# Patient Record
Sex: MALE | Race: WHITE
[De-identification: names, ages, dates, MRNs, and addresses within clinical notes are randomized per-mention and may not be internally consistent; named-entity substitution may affect disease eponyms.]

---

## 2018-08-22 ENCOUNTER — HOSPITAL ENCOUNTER (INPATIENT)
Dept: HOSPITAL 11 - JP.ED | Age: 78
LOS: 2 days | Discharge: HOME | DRG: 563 | End: 2018-08-24
Attending: INTERNAL MEDICINE | Admitting: INTERNAL MEDICINE
Payer: MEDICARE

## 2018-08-22 DIAGNOSIS — R42: ICD-10-CM

## 2018-08-22 DIAGNOSIS — E78.00: ICD-10-CM

## 2018-08-22 DIAGNOSIS — S82.435A: ICD-10-CM

## 2018-08-22 DIAGNOSIS — I48.2: ICD-10-CM

## 2018-08-22 DIAGNOSIS — Z88.2: ICD-10-CM

## 2018-08-22 DIAGNOSIS — H54.7: ICD-10-CM

## 2018-08-22 DIAGNOSIS — S82.445A: Primary | ICD-10-CM

## 2018-08-22 DIAGNOSIS — M25.572: ICD-10-CM
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
## 2018-08-22 DIAGNOSIS — R00.2: ICD-10-CM
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
## 2018-08-22 DIAGNOSIS — Z79.01: ICD-10-CM

## 2018-08-22 PROCEDURE — 83735 ASSAY OF MAGNESIUM: CPT

## 2018-08-22 PROCEDURE — C9113 INJ PANTOPRAZOLE SODIUM, VIA: HCPCS

## 2018-08-22 PROCEDURE — 81001 URINALYSIS AUTO W/SCOPE: CPT

## 2018-08-22 PROCEDURE — 85025 COMPLETE CBC W/AUTO DIFF WBC: CPT

## 2018-08-22 PROCEDURE — 27788 TREATMENT OF ANKLE FRACTURE: CPT

## 2018-08-22 PROCEDURE — 36415 COLL VENOUS BLD VENIPUNCTURE: CPT

## 2018-08-22 PROCEDURE — 84484 ASSAY OF TROPONIN QUANT: CPT

## 2018-08-22 PROCEDURE — 93005 ELECTROCARDIOGRAM TRACING: CPT

## 2018-08-22 PROCEDURE — 73610 X-RAY EXAM OF ANKLE: CPT

## 2018-08-22 PROCEDURE — 99285 EMERGENCY DEPT VISIT HI MDM: CPT

## 2018-08-22 PROCEDURE — 0QSKXZZ REPOSITION LEFT FIBULA, EXTERNAL APPROACH: ICD-10-PCS | Performed by: NURSE PRACTITIONER

## 2018-08-22 PROCEDURE — 80048 BASIC METABOLIC PNL TOTAL CA: CPT

## 2018-08-22 PROCEDURE — 96374 THER/PROPH/DIAG INJ IV PUSH: CPT

## 2018-08-23 PROCEDURE — 2W3RX1Z IMMOBILIZATION OF LEFT LOWER LEG USING SPLINT: ICD-10-PCS | Performed by: NURSE PRACTITIONER

## 2018-08-23 NOTE — PCM.CONS
H&P History of Present Illness





- General


Date of Service: 08/23/18


Admit Problem/Dx: 


 Admission Diagnosis/Problem





Admission Diagnosis/Problem      Ankle fracture








Source of Information: Patient, Provider, RN


History Limitations: Reports: No Limitations





- History of Present Illness


Onset of Symptoms: Reports: Sudden


Duration of Symptoms: Reports: Hour(s):


Location: Reports: Lower Extremity, Left


Quality: Reports: Pressure, Throbbing


Severity: Moderate


Improves with: Reports: None


Worsens with: Reports: None


Associated Symptoms: Reports: No Other Symptoms


  ** Left Ankle


Pain Score (Numeric/FACES): 5





- Related Data


Allergies/Adverse Reactions: 


 Allergies











Allergy/AdvReac Type Severity Reaction Status Date / Time


 


Sulfa (Sulfonamide Allergy  Swelling Verified 11/17/16 06:43





Antibiotics)     











Home Medications: 


 Home Meds





Lutein Extract/Zeaxanthin Ext [Lutein 15 MG Softgel] 1 each PO DAILY 01/06/14 [

History]


Multivitamin [Multi Vitamin Daily] 1 each PO DAILY 01/06/14 [History]


Rivaroxaban [Xarelto] 20 mg PO DAILY 08/22/18 [History]


atorvaSTATin [Lipitor] 20 mg PO BEDTIME 08/22/18 [History]











Past Medical History


HEENT History: Reports: Allergic Rhinitis, Cataract, Impaired Vision


Cardiovascular History: Reports: Afib, High Cholesterol


Gastrointestinal History: Reports: Colon Polyp


Musculoskeletal History: Reports: Fracture


Hematologic History: Reports: Anticoagulation Therapy





- Infectious Disease History


Infectious Disease History: Reports: Chicken Pox, Measles, Mumps, Shingles





- Past Surgical History


HEENT Surgical History: Reports: Cataract Surgery


GI Surgical History: Reports: Appendectomy, Colonoscopy, Hernia, Abdominal, 

Hernia, Inguinal


Dermatological Surgical History: Reports: Skin Biopsy





Social & Family History





- Tobacco Use


Smoking Status *Q: Never Smoker


Second Hand Smoke Exposure: No





- Caffeine Use


Caffeine Use: Reports: Coffee





- Alcohol Use


Days Per Week of Alcohol Use: 5


Number of Drinks Per Day: 3


Total Drinks Per Week: 15


Date of Last Drink: 08/22/18


Time of Last Drink: 17:00





- Recreational Drug Use


Recreational Drug Use: No





- Living Situation & Occupation


Living situation: Reports:  (x30 year. has a S.O.), Single, Alone


Occupation: Retired (retired , [place], 

has 3 children , who [place]. he is very active)





H&P Review of Systems





- Review of Systems:


Review Of Systems: See Below


General: Reports: No Symptoms


HEENT: Reports: No Symptoms


Pulmonary: Reports: No Symptoms


Cardiovascular: Reports: No Symptoms


Gastrointestinal: Reports: No Symptoms


Genitourinary: Reports: No Symptoms


Musculoskeletal: Reports: Joint Pain


Skin: Reports: No Symptoms


Psychiatric: Reports: No Symptoms


Neurological: Reports: No Symptoms


Hematologic/Lymphatic: Reports: No Symptoms


Immunologic: Reports: No Symptoms





Exam





- Exam


Exam: See Below





- Vital Signs


Vital Signs: 


 Last Vital Signs











Temp  97.7 F   08/23/18 06:49


 


Pulse  77   08/23/18 06:49


 


Resp  15   08/23/18 06:49


 


BP  135/66   08/23/18 06:49


 


Pulse Ox  97   08/23/18 07:12











Weight: 209 lb 9.6 oz





- Exam


General: Alert, Oriented


HEENT: Mucosa Moist & Pink, Pupils Equal, Pupils Reactive


Neck: Supple, Trachea Midline


Cardiovascular: Regular Rate


Extremities: Leg Pain, Limited Range of Motion


Peripheral Pulses: 2+: Dorsalis Pedis (L)


Skin: Warm, Dry, Intact





- Patient Data


Lab Results Last 24 hrs: 


 Laboratory Results - last 24 hr











  08/23/18 08/23/18 08/23/18 Range/Units





  03:36 05:11 05:11 


 


WBC   8.4   (4.5-11.0)  K/uL


 


RBC   3.98 L   (4.30-5.90)  M/uL


 


Hgb   13.6  D   (12.0-15.0)  g/dL


 


Hct   40.6   (40.0-54.0)  %


 


MCV   102 H   (80-98)  fL


 


MCH   34 H   (27-31)  pg


 


MCHC   34   (32-36)  %


 


Plt Count   255   (150-400)  K/uL


 


Neut % (Auto)   62   (36-66)  %


 


Lymph % (Auto)   19 L   (24-44)  %


 


Mono % (Auto)   17 H   (2-6)  %


 


Eos % (Auto)   2   (2-4)  %


 


Baso % (Auto)   0   (0-1)  %


 


Sodium    135 L  (140-148)  mmol/L


 


Potassium    4.0  (3.6-5.2)  mmol/L


 


Chloride    102  (100-108)  mmol/L


 


Carbon Dioxide    23  (21-32)  mmol/L


 


Anion Gap    14.0  (5.0-14.0)  mmol/L


 


BUN    9  (7-18)  mg/dL


 


Creatinine    0.9  (0.8-1.3)  mg/dL


 


Est Cr Clr Drug Dosing    67.65  mL/min


 


Estimated GFR (MDRD)    > 60  (>60)  


 


Glucose    107 H  ()  mg/dL


 


Calcium    7.8 L  (8.5-10.1)  mg/dL


 


Urine Color  Yellow    


 


Urine Appearance  Clear    


 


Urine pH  5.0    (4.5-8.0)  


 


Ur Specific Gravity  1.010    (1.008-1.030)  


 


Urine Protein  Negative    (NEGATIVE)  mg/dL


 


Urine Glucose (UA)  Normal    (NEGATIVE)  mg/dL


 


Urine Ketones  Negative    (NEGATIVE)  mg/dL


 


Urine Occult Blood  Negative    (NEGATIVE)  


 


Urine Nitrite  Negative    (NEGAITVE)  


 


Urine Bilirubin  Negative    (NEGATIVE)  


 


Urine Urobilinogen  Normal    (NORMAL)  mg/dL


 


Ur Leukocyte Esterase  Negative    (NEGATIVE)  


 


Urine RBC  0-5    (0-5)  


 


Urine WBC  0-5    (0-5)  


 


Ur Epithelial Cells  Rare    


 


Amorphous Sediment  Rare    


 


Urine Bacteria  Not seen    


 


Urine Mucus  Few    











Result Diagrams: 


 08/23/18 05:11





 08/23/18 05:11


Imaging Impressions Last 24 hrs: 


Left ankle, Dinh B, closed, distal fibula fracture.








Consult PN Assessment/Plan


POD#: 0


Procedures: 


Procedures





ASSAY OF MAGNESIUM (01/08/14)


ASSAY OF TROPONIN QUANT (01/08/14)


ASSAY THYROID STIM HORMONE (01/08/14)


CATARACT SURG W/IOL 1 STAGE (11/17/16)


COLONOSCOPY AND BIOPSY (01/08/14)


COMPLETE CBC W/AUTO DIFF WBC (01/08/14)


COMPREHEN METABOLIC PANEL (01/08/14)


ECG MONIT/REPRT UP TO 48 HRS (01/16/14)


ECG MONIT/REPRT UP TO 48 HRS (01/16/14)


ELECTROCARDIOGRAM REPORT (01/08/14)


ELECTROCARDIOGRAM TRACING (01/08/14)


OFFICE/OUTPATIENT VISIT EST (01/08/14)


PROTHROMBIN TIME (01/08/14)


ROUTINE VENIPUNCTURE (01/08/14)


TISSUE EXAM BY PATHOLOGIST (01/08/14)


TTE W/DOPPLER COMPLETE (03/05/18)








(1) Fracture of fibula


SNOMED Code(s): 24442551


   Code(s): S82.409A - UNSP FRACTURE OF SHAFT OF UNSP FIBULA, INIT FOR CLOS FX 

  Priority: High   Current Visit: Yes   


Problem List Initiated/Reviewed/Updated: Yes


Plan: 


I the pleasure visiting with the patient in his hospital room this morning. His 

pain is controlled. About 5 years ago he did have a distal fibular plate placed 

on the right ankle. He tolerated this well. He did not have problems with 

Percocet. He is allergic to sulfas. He is not allergic to penicillin. I 

discussed the risks and benefits of the procedure with the patient. We will see 

him 2 weeks postoperatively for staple removal. I have discussed follow-up with 

[facility].  They are willing to see him postoperatively.

## 2018-08-23 NOTE — PCM.PN
- General Info


Date of Service: 08/23/18


Subjective Update: 





Mr. Dias is a 78-year-old gentleman who fell and experienced a left ankle 

fracture yesterday. He presented to the emergency department and was admitted 

to observation status for surgical repair the fracture this morning by Dr. James Horton. Midmorning he was up attempting to walk with use of a walker 

when his heart rate became very elevated in the range of 170-200 associated 

with diaphoresis and weakness. He does have a known history of atrial 

fibrillation with variable ventricular response. He has been on long-term oral 

anticoagulation. Denied any symptoms of chest pain or pressure, EKG showed no 

acute ST segment changes and troponin level is within normal range.


Functional Status: Reports: Pain Controlled, Tolerating Diet, Urinating





- Review of Systems


General: Reports: Weakness.  Denies: Fever, Chills


Pulmonary: Reports: No Symptoms


Cardiovascular: Reports: Palpitations, Dyspnea on Exertion, Lightheadedness.  

Denies: Chest Pain, Orthopnea, PND, Edema


Gastrointestinal: Reports: No Symptoms


Musculoskeletal: Reports: Other (Pain left lower leg secondary to fracture)





- Patient Data


Vitals - Most Recent: 


 Last Vital Signs











Temp  98.3 F   08/23/18 12:17


 


Pulse  69   08/23/18 12:17


 


Resp  14   08/23/18 12:17


 


BP  117/79   08/23/18 12:17


 


Pulse Ox  95   08/23/18 12:17











Weight - Most Recent: 209 lb 9.601 oz


I&O - Last 24 Hours: 


 Intake & Output











 08/22/18 08/23/18 08/23/18





 22:59 06:59 14:59


 


Intake Total  618 


 


Output Total  150 500


 


Balance  468 -500











Lab Results Last 24 Hours: 


 Laboratory Results - last 24 hr











  08/23/18 08/23/18 08/23/18 Range/Units





  03:36 05:11 05:11 


 


WBC   8.4   (4.5-11.0)  K/uL


 


RBC   3.98 L   (4.30-5.90)  M/uL


 


Hgb   13.6  D   (12.0-15.0)  g/dL


 


Hct   40.6   (40.0-54.0)  %


 


MCV   102 H   (80-98)  fL


 


MCH   34 H   (27-31)  pg


 


MCHC   34   (32-36)  %


 


Plt Count   255   (150-400)  K/uL


 


Neut % (Auto)   62   (36-66)  %


 


Lymph % (Auto)   19 L   (24-44)  %


 


Mono % (Auto)   17 H   (2-6)  %


 


Eos % (Auto)   2   (2-4)  %


 


Baso % (Auto)   0   (0-1)  %


 


Sodium    135 L  (140-148)  mmol/L


 


Potassium    4.0  (3.6-5.2)  mmol/L


 


Chloride    102  (100-108)  mmol/L


 


Carbon Dioxide    23  (21-32)  mmol/L


 


Anion Gap    14.0  (5.0-14.0)  mmol/L


 


BUN    9  (7-18)  mg/dL


 


Creatinine    0.9  (0.8-1.3)  mg/dL


 


Est Cr Clr Drug Dosing    67.65  mL/min


 


Estimated GFR (MDRD)    > 60  (>60)  


 


Glucose    107 H  ()  mg/dL


 


Calcium    7.8 L  (8.5-10.1)  mg/dL


 


Magnesium     (1.8-2.4)  mg/dL


 


Troponin I     (0.000-0.056)  ng/mL


 


Urine Color  Yellow    


 


Urine Appearance  Clear    


 


Urine pH  5.0    (4.5-8.0)  


 


Ur Specific Gravity  1.010    (1.008-1.030)  


 


Urine Protein  Negative    (NEGATIVE)  mg/dL


 


Urine Glucose (UA)  Normal    (NEGATIVE)  mg/dL


 


Urine Ketones  Negative    (NEGATIVE)  mg/dL


 


Urine Occult Blood  Negative    (NEGATIVE)  


 


Urine Nitrite  Negative    (NEGAITVE)  


 


Urine Bilirubin  Negative    (NEGATIVE)  


 


Urine Urobilinogen  Normal    (NORMAL)  mg/dL


 


Ur Leukocyte Esterase  Negative    (NEGATIVE)  


 


Urine RBC  0-5    (0-5)  


 


Urine WBC  0-5    (0-5)  


 


Ur Epithelial Cells  Rare    


 


Amorphous Sediment  Rare    


 


Urine Bacteria  Not seen    


 


Urine Mucus  Few    














  08/23/18 Range/Units





  11:20 


 


WBC   (4.5-11.0)  K/uL


 


RBC   (4.30-5.90)  M/uL


 


Hgb   (12.0-15.0)  g/dL


 


Hct   (40.0-54.0)  %


 


MCV   (80-98)  fL


 


MCH   (27-31)  pg


 


MCHC   (32-36)  %


 


Plt Count   (150-400)  K/uL


 


Neut % (Auto)   (36-66)  %


 


Lymph % (Auto)   (24-44)  %


 


Mono % (Auto)   (2-6)  %


 


Eos % (Auto)   (2-4)  %


 


Baso % (Auto)   (0-1)  %


 


Sodium   (140-148)  mmol/L


 


Potassium   (3.6-5.2)  mmol/L


 


Chloride   (100-108)  mmol/L


 


Carbon Dioxide   (21-32)  mmol/L


 


Anion Gap   (5.0-14.0)  mmol/L


 


BUN   (7-18)  mg/dL


 


Creatinine   (0.8-1.3)  mg/dL


 


Est Cr Clr Drug Dosing   mL/min


 


Estimated GFR (MDRD)   (>60)  


 


Glucose   ()  mg/dL


 


Calcium   (8.5-10.1)  mg/dL


 


Magnesium  1.8  (1.8-2.4)  mg/dL


 


Troponin I  < 0.017  (0.000-0.056)  ng/mL


 


Urine Color   


 


Urine Appearance   


 


Urine pH   (4.5-8.0)  


 


Ur Specific Gravity   (1.008-1.030)  


 


Urine Protein   (NEGATIVE)  mg/dL


 


Urine Glucose (UA)   (NEGATIVE)  mg/dL


 


Urine Ketones   (NEGATIVE)  mg/dL


 


Urine Occult Blood   (NEGATIVE)  


 


Urine Nitrite   (NEGAITVE)  


 


Urine Bilirubin   (NEGATIVE)  


 


Urine Urobilinogen   (NORMAL)  mg/dL


 


Ur Leukocyte Esterase   (NEGATIVE)  


 


Urine RBC   (0-5)  


 


Urine WBC   (0-5)  


 


Ur Epithelial Cells   


 


Amorphous Sediment   


 


Urine Bacteria   


 


Urine Mucus   











Med Orders - Current: 


 Current Medications





Albuterol (Proventil Neb Soln)  2.5 mg NEB Q4H PRN


   PRN Reason: Shortness Of Breath/wheezing


Diltiazem HCl (Cardizem)  30 mg PO Q6H [facility]


   Last Admin: 08/23/18 12:49 Dose:  30 mg


Docusate Sodium (Colace)  100 mg PO BID PRN


   PRN Reason: Constipation


Hydromorphone HCl (Dilaudid)  1 mg IVPUSH Q3H PRN


   PRN Reason: Pain


   Last Admin: 08/23/18 07:35 Dose:  1 mg


Lorazepam (Ativan)  1 mg IV Q6H PRN


   PRN Reason: Nausea/Vomiting


Magnesium Oxide (Magnesium Oxide)  400 mg PO BID [facility]


Melatonin (Melatonin)  6 mg PO BEDTIME [facility]


   Last Admin: 08/23/18 01:56 Dose:  6 mg


Morphine Sulfate (Morphine)  2 mg IVPUSH Q2H PRN


   PRN Reason: Pain (severe 7-10)


Ondansetron HCl (Zofran)  4 mg IV Q4H PRN


   PRN Reason: Nausea/Vomiting


Pantoprazole Sodium (Protonix Iv***)  40 mg IVPUSH ACBREAKFAST [facility]


   Last Admin: 08/23/18 07:42 Dose:  40 mg


Zolpidem Tartrate (Ambien)  5 mg PO BEDTIME [facility]


   Last Admin: 08/23/18 01:46 Dose:  Not Given





Discontinued Medications





Bupivacaine HCl (Marcaine 0.5%) Confirm Administered Dose 50 ml .ROUTE .STK-MED 

ONE


   Stop: 08/23/18 10:32


Diltiazem HCl (Diltiazem)  20 mg IVPUSH ONETIME ONE


   Stop: 08/23/18 11:46


Gentamicin Sulfate (Gentamicin) Confirm Administered Dose 240 mg .ROUTE .STK-

MED ONE


   Stop: 08/23/18 10:32


Hydromorphone HCl (Dilaudid)  1 mg IVPUSH ONETIME ONE


   Stop: 08/22/18 23:31


   Last Admin: 08/22/18 23:52 Dose:  1 mg


Sodium Chloride (Normal Saline)  1,000 mls @ 999 mls/hr IV ASDIRECTED [facility]


   Last Admin: 08/22/18 23:46 Dose:  999 mls/hr


Cefazolin Sodium/Dextrose 2 gm (/ Premix)  50 mls @ 100 mls/hr IV DAILY PRN


   PRN Reason: Other


   Stop: 08/24/18 11:31


Sodium Chloride (Normal Saline)  1,000 mls @ 125 mls/hr IV ASDIRECTED [facility]


   Last Admin: 08/23/18 10:05 Dose:  125 mls/hr


Cefazolin Sodium 2 gm/ Sodium (Chloride)  50 mls @ 100 mls/hr IV ONCALL ONE


   Stop: 08/23/18 11:59


Diltiazem HCl 125 mg/ Dextrose (/Water)  125 mls @ 5 mls/hr IV TITRATE STEVEN; 

Protocol


Lorazepam (Ativan)  0.5 mg IVPUSH ONETIME ONE


   Stop: 08/22/18 23:31


   Last Admin: 08/22/18 23:52 Dose:  0.5 mg


Melatonin (Melatonin)  6 mg PO BEDTIME [facility]


Ondansetron HCl (Zofran)  4 mg IVPUSH ONETIME ONE


   Stop: 08/22/18 23:31


   Last Admin: 08/22/18 23:53 Dose:  4 mg


Povidone Iodine (Betadine 10% Soln) Confirm Administered Dose 1 ml .ROUTE .STK-

MED ONE


   Stop: 08/23/18 10:32











- Exam


General: Alert, Oriented, Cooperative, Mild Distress


Lungs: Clear to Auscultation, Normal Respiratory Effort


Cardiovascular: No Murmurs, Irregular Rhythm, Tachycardia.  No: Bradycardia


GI/Abdominal Exam: Soft, Non-Tender, No Organomegaly, No Distention


Extremities: Other (Left lower leg is currently splinted)





- Problem List Review


Problem List Initiated/Reviewed/Updated: Yes





- My Orders


Last 24 Hours: 


My Active Orders





08/23/18 09:20


PT Evaluation and Treatment [CONS] Routine 





08/23/18 10:56


Cardiac Monitoring [RC] .As Directed 


EKG 12 Lead [EK] Routine 





08/23/18 10:57


EKG Documentation Completion [RC] ASDIRECTED 





08/23/18 11:21


Patient Status [ADT] Routine 





08/23/18 12:00


Diltiazem IR [Cardizem]   30 mg PO Q6H 





08/23/18 13:41


Convert IV to Saline Lock [OM.PC] Routine 





08/23/18 13:45


Magnesium Oxide   400 mg PO BID 





08/23/18 Lunch


Regular Diet [DIET] 





08/24/18 05:00


BASIC METABOLIC PANEL,BMP [CHEM] Timed 














- Plan


Plan:: 





ASSESSMENT / PLAN





Left ankle fracture-surgery is on hold because of atrial fibrillation with 

rapid ventricular response


-Plan for outpatient surgery next Tuesday





Atrial fibrillation, chronic-complicated by rapid ventricular response


-Transfer to intensive care unit for further evaluation and management


-Diltiazem 30 mg by mouth every 6 hours


Treated with Xarelto, placed on hold pending surgery





Maintenance issues


-Orders home meds: Resume usual oral medications


-Nutrition: Regular diet


-Rushing catheter not indicated at this time


-DVT: Xarelto on hold


-GI Prophalaxis; Protonix 40mg IV daily


-


CODE STATUS: Full





Admission status: Admit to Observation


-I expect this patient to stay less than 24 hours, not to exceed 96 hours for 

evaluation and management of this problem.





Disposition: Home





Primary care provider: Dr. Escobar





Hospitalist: Dr. Blum





Attending: Dr. Desiree Horton, orthopedic surgeon

## 2018-08-23 NOTE — EDM.PDOC
ED HPI GENERAL MEDICAL PROBLEM





- General


Chief Complaint: Lower Extremity Injury/Pain


Stated Complaint: LEFT ANKLE SWOLLEN, PAINFUL


Time Seen by Provider: 08/22/18 21:48


Source of Information: Reports: Patient, RN


History Limitations: Reports: No Limitations





- History of Present Illness


INITIAL COMMENTS - FREE TEXT/NARRATIVE: 





left ankle pain; this is a 78 year old male presents to ER for left ankle pain. 

reports at 7pm this evening was out on his deck, sitting in a chair went to get 

up, the deck was wet and he slipped and twisted the ankle. He had immediate 

pain and swelling. unable to put any wt on the foot. denies any other injury, 

did not have any LOC.  He called his neighbor to bring him to the hospital. 


Onset: Sudden


Onset Date: 08/22/18


Onset Time: 19:00


Duration: Hour(s):, Constant


Location: Reports: Lower Extremity, Left (ankle)


Quality: Reports: Ache, Sharp


Severity: Moderate


Improves with: Reports: Cold Therapy


Worsens with: Reports: Movement


Context: Reports: Trauma (fall at home on his deck)


  ** Left Ankle


Pain Score (Numeric/FACES): 5





- Related Data


 Allergies











Allergy/AdvReac Type Severity Reaction Status Date / Time


 


Sulfa (Sulfonamide Allergy  Swelling Verified 11/17/16 06:43





Antibiotics)     











Home Meds: 


 Home Meds





Lutein Extract/Zeaxanthin Ext [Lutein 15 MG Softgel] 1 each PO DAILY 01/06/14 [

History]


Multivitamin [Multi Vitamin Daily] 1 each PO DAILY 01/06/14 [History]


Rivaroxaban [Xarelto] 20 mg PO DAILY 08/22/18 [History]


atorvaSTATin [Lipitor] 20 mg PO BEDTIME 08/22/18 [History]











Past Medical History


HEENT History: Reports: Allergic Rhinitis, Cataract, Impaired Vision


Cardiovascular History: Reports: Afib, High Cholesterol


Gastrointestinal History: Reports: Colon Polyp


Musculoskeletal History: Reports: Fracture


Hematologic History: Reports: Anticoagulation Therapy





- Infectious Disease History


Infectious Disease History: Reports: Chicken Pox, Measles, Mumps, Shingles





- Past Surgical History


HEENT Surgical History: Reports: Cataract Surgery


GI Surgical History: Reports: Appendectomy, Colonoscopy, Hernia, Abdominal, 

Hernia, Inguinal


Dermatological Surgical History: Reports: Skin Biopsy





Social & Family History





- Tobacco Use


Smoking Status *Q: Never Smoker





- Caffeine Use


Caffeine Use: Reports: Coffee





- Alcohol Use


Days Per Week of Alcohol Use: 5


Number of Drinks Per Day: 3


Total Drinks Per Week: 15





- Recreational Drug Use


Recreational Drug Use: No





- Living Situation & Occupation


Living situation: Reports:  (x30 year. has a S.O.), Single, Alone


Occupation: Retired (retired , lives alone in [facility], 

has 3 children , who [place]. he is very active)





Review of Systems





- Review of Systems


Review Of Systems: See Below


Constitutional: Reports: Other (left ankle pain)


Eyes: Reports: No Symptoms, Glasses


Ears: Reports: Other (ears feel plugged)


Nose: Reports: No Symptoms


Mouth/Throat: Reports: No Symptoms, Other (dentures)


Respiratory: Reports: No Symptoms


Cardiovascular: Reports: No Symptoms


GI/Abdominal: Reports: No Symptoms, Other (hx of hernia repair; umbilical and 

bilateral inguinal)


Genitourinary: Reports: No Symptoms


Musculoskeletal: Reports: Leg Pain (left), Foot Pain (left), Joint Pain (left 

ankle), Joint Swelling (left ankle)


Skin: Reports: Dryness (Left ankle), Bruising (Left ankle), Change in Color


Neurological: Reports: No Symptoms


Psychiatric: Reports: No Symptoms





ED EXAM, GENERAL





- Physical Exam


Exam: See Below


Exam Limited By: No Limitations


General Appearance: Alert, WD/WN, Mild Distress, Other (Need to well-groomed, 

pleasant in general good health)


Eye Exam: Bilateral Eye: Normal Inspection, PERRL, Other (Wears corrective 

glasses)


Ears: Normal External Exam, Other (Canals impacted with soft yellow earwax, 

unable to visualize tympanic membrane)


Ear Exam: Bilateral Ear: Other (Excessive earwax)


Nose: Normal Inspection, Normal Mucosa, No Blood


Throat/Mouth: Normal Inspection, Normal Lips, Normal Teeth, Normal Gums, Normal 

Oropharynx, Normal Voice, No Airway Compromise, Other (Dentures noted)


Head: Atraumatic, Normocephalic


Neck: Normal Inspection, Supple, Non-Tender, Full Range of Motion


Respiratory/Chest: No Respiratory Distress, Lungs Clear, Normal Breath Sounds, 

No Accessory Muscle Use, Chest Non-Tender


Cardiovascular: Normal Peripheral Pulses, Regular Rate, Rhythm, No Murmur


Peripheral Pulses: 2+: Radial (L), Radial (R), Dorsalis Pedis (L), Dorsalis 

Pedis (R)


GI/Abdominal: Normal Bowel Sounds, Soft, Non-Tender, No Organomegaly, No 

Distention, No Abnormal Bruit, No Mass


 (Male) Exam: Deferred


Rectal (Males) Exam: Deferred


Back Exam: Normal Inspection, Full Range of Motion, NT


Extremities: Joint Swelling (Left ankle), Leg Pain (Left ankle with edema, 

bruising, and pain with any movement.), Limited Range of Motion (Left ankle 

left ankle)


Neurological: Alert (She now), Oriented, CN II-XII Intact, Normal Cognition, 

Normal Gait, Normal Reflexes, No Motor/Sensory Deficits


Psychiatric: Normal Affect


Skin Exam: Warm, Dry, Intact, No Rash, Other (Bruising noted to the left ankle 

and toes)


Lymphatic: No Adenopathy





ED TRAUMA EXTREMITY PROCEDURES





- Splinting


  ** Left Lower Extremity


Pre-Procedure NV Status: Normal


Post-Procedure NV Status: Normal


Splint Material: Aluminum-Foam


Splint Design: Stirrup


Applied & Form Fitted By: Provider (Dr. Beckman)


Provider Post-Splint Application NV Check: NV Status Normal, Good Position


Complications: No





Course





- Vital Signs


Last Recorded V/S: 


 Last Vital Signs











Temp  37.3 C   08/23/18 00:45


 


Pulse  65   08/23/18 00:45


 


Resp  15   08/23/18 00:45


 


BP  109/73   08/23/18 00:45


 


Pulse Ox  95   08/23/18 00:45














- Orders/Labs/Meds


Orders: 


 Active Orders 24 hr











 Category Date Time Status


 


 Ankle Min 3V Lt [CR] Stat Exams  08/22/18 22:33 Taken


 


 Sodium Chloride 0.9% [Normal Saline] 1,000 ml Med  08/22/18 23:45 Active





 IV ASDIRECTED   








 Medication Orders





Albuterol (Proventil Neb Soln)  2.5 mg NEB Q4H PRN


   PRN Reason: Shortness Of Breath/wheezing


Docusate Sodium (Colace)  100 mg PO BID PRN


   PRN Reason: Constipation


Hydromorphone HCl (Dilaudid)  1 mg IVPUSH Q3H PRN


   PRN Reason: Pain


Sodium Chloride (Normal Saline)  1,000 mls @ 999 mls/hr IV ASDIRECTED STEVEN


   Last Admin: 08/22/18 23:46  Dose: 999 mls/hr


Cefazolin Sodium/Dextrose 2 gm (/ Premix)  50 mls @ 100 mls/hr IV DAILY PRN


   PRN Reason: Other


   Stop: 08/24/18 11:31


Sodium Chloride (Normal Saline)  1,000 mls @ 125 mls/hr IV ASDIRECTED STEVEN


Lorazepam (Ativan)  1 mg IV Q6H PRN


   PRN Reason: Nausea/Vomiting


Morphine Sulfate (Morphine)  2 mg IVPUSH Q2H PRN


   PRN Reason: Pain (severe 7-10)


Ondansetron HCl (Zofran)  4 mg IV Q4H PRN


   PRN Reason: Nausea/Vomiting


Pantoprazole Sodium (Protonix Iv***)  40 mg IVPUSH ACBREAKFAST STEVEN


Zolpidem Tartrate (Ambien)  5 mg PO BEDTIME STEVEN








Meds: 


Medications











Generic Name Dose Route Start Last Admin





  Trade Name Freq  PRN Reason Stop Dose Admin


 


Albuterol  2.5 mg  08/23/18 00:45  





  Proventil Neb Soln  NEB   





  Q4H PRN   





  Shortness Of Breath/wheezing   





     





     





     


 


Docusate Sodium  100 mg  08/23/18 00:45  





  Colace  PO   





  BID PRN   





  Constipation   





     





     





     


 


Hydromorphone HCl  1 mg  08/23/18 00:45  





  Dilaudid  IVPUSH   





  Q3H PRN   





  Pain   





     





     





     


 


Sodium Chloride  1,000 mls @ 999 mls/hr  08/22/18 23:45  08/22/18 23:46





  Normal Saline  IV   999 mls/hr





  ASDIRECTED STEVEN   Administration





     





     





     





     


 


Cefazolin Sodium/Dextrose 2 gm  50 mls @ 100 mls/hr  08/23/18 11:30  





  / Premix  IV  08/24/18 11:31  





  DAILY PRN   





  Other   





     





     





     


 


Sodium Chloride  1,000 mls @ 125 mls/hr  08/23/18 00:45  





  Normal Saline  IV   





  ASDIRECTED STEVEN   





     





     





     





     


 


Lorazepam  1 mg  08/23/18 00:45  





  Ativan  IV   





  Q6H PRN   





  Nausea/Vomiting   





     





     





     


 


Morphine Sulfate  2 mg  08/23/18 00:45  





  Morphine  IVPUSH   





  Q2H PRN   





  Pain (severe 7-10)   





     





     





     


 


Ondansetron HCl  4 mg  08/23/18 00:45  





  Zofran  IV   





  Q4H PRN   





  Nausea/Vomiting   





     





     





     


 


Pantoprazole Sodium  40 mg  08/23/18 07:30  





  Protonix Iv***  IVPUSH   





  ACBREAKFAST STEVEN   





     





     





     





     


 


Zolpidem Tartrate  5 mg  08/23/18 00:45  





  Ambien  PO   





  BEDTIME STEVEN   





     





     





     





     














Discontinued Medications














Generic Name Dose Route Start Last Admin





  Trade Name Freq  PRN Reason Stop Dose Admin


 


Hydromorphone HCl  1 mg  08/22/18 23:30  08/22/18 23:52





  Dilaudid  IVPUSH  08/22/18 23:31  1 mg





  ONETIME ONE   Administration





     





     





     





     


 


Lorazepam  0.5 mg  08/22/18 23:30  08/22/18 23:52





  Ativan  IVPUSH  08/22/18 23:31  0.5 mg





  ONETIME ONE   Administration





     





     





     





     


 


Ondansetron HCl  4 mg  08/22/18 23:30  08/22/18 23:53





  Zofran  IVPUSH  08/22/18 23:31  4 mg





  ONETIME ONE   Administration





     





     





     





     














- Radiology Interpretation


Free Text/Narrative:: 





X-ray shows a left ankle fracture, spiral oblique fracture of the distal fibula 

and abnormal ankle mortise





Consulted with Dr. Mckinley Horton orthopedic surgeon


Recommends admission to hospital service


-surgery at noon tomorrow, August 23, 2018


-Give Ancef 2 g prior to surgery


-Nothing by mouth after midnight


-reduce fracture, Placed in a posterior splint and reduced by ER provider


-For reduction of fracture, patient was given 1 mg Dilaudid and 0.5 mg IV, pain 

control is obtained, IV fluids normal saline at 999 mL per hour.





Discuss all the above plan of care with patient,


he agrees with plan of care, he will notify his family in the morning

















Departure





- Departure


Time of Disposition: 00:15


Disposition: Admitted As Inpatient 66


Condition: Good


Clinical Impression: 


 Fracture of ankle, Fracture of fibula








- Discharge Information


*PRESCRIPTION DRUG MONITORING PROGRAM REVIEWED*: No


*COPY OF PRESCRIPTION DRUG MONITORING REPORT IN PATIENT MINI: No





- Problem List & Annotations


(1) Fracture of ankle


SNOMED Code(s): 89975755


   Code(s): S82.899A - OTH FRACTURE OF UNSP LOWER LEG, INIT FOR CLOS FX   Status

: Acute   Priority: High   Current Visit: Yes   





(2) Fracture of fibula


SNOMED Code(s): 41130293


   Code(s): S82.409A - UNSP FRACTURE OF SHAFT OF UNSP FIBULA, INIT FOR CLOS FX 

  Status: Acute   Priority: High   Current Visit: Yes   





(3) Afib, Atrial fibrillation


SNOMED Code(s): 86096330


   Code(s): I48.91 - UNSPECIFIED ATRIAL FIBRILLATION   Status: Acute   Priority

: Low   Current Visit: No   





- Problem List Review


Problem List Initiated/Reviewed/Updated: Yes





- My Orders


Last 24 Hours: 


My Active Orders





08/22/18 22:33


Ankle Min 3V Lt [CR] Stat 





08/22/18 23:45


Sodium Chloride 0.9% [Normal Saline] 1,000 ml IV ASDIRECTED 














- Assessment/Plan


Last 24 Hours: 


My Active Orders





08/22/18 22:33


Ankle Min 3V Lt [CR] Stat 





08/22/18 23:45


Sodium Chloride 0.9% [Normal Saline] 1,000 ml IV ASDIRECTED

## 2018-08-23 NOTE — PCM.HP
H&P History of Present Illness





- General


Date of Service: 08/22/18


Admit Problem/Dx: 


 Admission Diagnosis/Problem





Admission Diagnosis/Problem      Ankle fracture








Source of Information: Patient


History Limitations: Reports: No Limitations





- History of Present Illness


Initial Comments - Free Text/Narative: 








left ankle pain; this is a 78 year old male presents to ER for left ankle pain. 

reports at 7pm this evening was out on his deck, sitting in a chair went to get 

up, the deck was wet and he slipped and twisted the ankle. He had immediate 

pain and swelling. unable to put any wt on the foot. denies any other injury, 

did not have any LOC.  He called his neighbor to bring him to the hospital.





X-rays of left ankle show a spiral oblique fracture of the distal fibula was 

abnormal mortise of ankle. Consult to Dr. Colin Horton who advised admission 

for surgery tomorrow at noon, also advised reduction of the ankle. This was 

done by ER provider without complication. Placed in the posterior's steel and 

foam splint. While in emergency room given IV fluids, Dilaudid, Ativan, and 

Zofran. 


Onset of Symptoms: Reports: Sudden


Symptom Onset Date: 08/22/18


Symptom Onset Time: 19:00


Duration of Symptoms: Reports: Hour(s):


Location: Reports: Lower Extremity, Left (Ankle)


Quality: Reports: Ache, Sharp, Stabbing


Severity: Moderate


Improves with: Reports: Immobilization


Worsens with: Reports: Movement


Context: Reports: Other (Fall [place]


Associated Symptoms: Reports: No Other Symptoms


  ** Left Ankle


Pain Score (Numeric/FACES): 5





- Related Data


Allergies/Adverse Reactions: 


 Allergies











Allergy/AdvReac Type Severity Reaction Status Date / Time


 


Sulfa (Sulfonamide Allergy  Swelling Verified 11/17/16 06:43





Antibiotics)     











Home Medications: 


 Home Meds





Lutein Extract/Zeaxanthin Ext [Lutein 15 MG Softgel] 1 each PO DAILY 01/06/14 [

History]


Multivitamin [Multi Vitamin Daily] 1 each PO DAILY 01/06/14 [History]


Rivaroxaban [Xarelto] 20 mg PO DAILY 08/22/18 [History]


atorvaSTATin [Lipitor] 20 mg PO BEDTIME 08/22/18 [History]











Past Medical History


HEENT History: Reports: Allergic Rhinitis, Cataract, Impaired Vision


Cardiovascular History: Reports: Afib, High Cholesterol


Gastrointestinal History: Reports: Colon Polyp


Musculoskeletal History: Reports: Fracture


Hematologic History: Reports: Anticoagulation Therapy





- Infectious Disease History


Infectious Disease History: Reports: Chicken Pox, Measles, Mumps, Shingles





- Past Surgical History


HEENT Surgical History: Reports: Cataract Surgery


GI Surgical History: Reports: Appendectomy, Colonoscopy, Hernia, Abdominal, 

Hernia, Inguinal


Dermatological Surgical History: Reports: Skin Biopsy





Social & Family History





- Tobacco Use


Smoking Status *Q: Never Smoker


Second Hand Smoke Exposure: No





- Caffeine Use


Caffeine Use: Reports: Coffee





- Alcohol Use


Days Per Week of Alcohol Use: 5


Number of Drinks Per Day: 3


Total Drinks Per Week: 15


Date of Last Drink: 08/22/18


Time of Last Drink: 17:00





- Recreational Drug Use


Recreational Drug Use: No





- Living Situation & Occupation


Living situation: Reports:  (x30 year. has a S.O.), Single, Alone


Occupation: Retired (retired , lives alone in [facility], 

has 3 children , who [place]. he is very active)





H&P Review of Systems





- Review of Systems:


Review Of Systems: See Below


General: Reports: Other (Left lower leg pain)


HEENT: Reports: No Symptoms, Glasses, Other (Dentures)


Pulmonary: Reports: No Symptoms


Cardiovascular: Reports: No Symptoms, Other (History of atrial fib., treated 

with  Xarelto 20mg daily)


Gastrointestinal: Reports: No Symptoms


Genitourinary: Reports: No Symptoms


Musculoskeletal: Reports: Leg Pain, Foot Pain (Left left), Joint Pain (Left 

ankle), Joint Swelling (Left ankle and foot)


Skin: Reports: Bruising (Left ankle and foot including toes)


Psychiatric: Reports: No Symptoms


Neurological: Reports: No Symptoms


Hematologic/Lymphatic: Reports: No Symptoms


Immunologic: Reports: No Symptoms





Exam





- Exam


Exam: See Below





- Vital Signs


Vital Signs: 


 Last Vital Signs











Temp  37.3 C   08/23/18 00:45


 


Pulse  65   08/23/18 00:45


 


Resp  15   08/23/18 00:45


 


BP  109/73   08/23/18 00:45


 


Pulse Ox  95   08/23/18 00:45











Weight: 95.073 kg





- Exam


Quality Assessment: Supplemental Oxygen (2 L per nasal cannula)


General: Alert, Oriented, Cooperative, Other (Calm and pain control with IV 

Ativan and Dilaudid)


HEENT: PERRLA, Conjunctiva Clear, EACs Clear, EOMI, Hearing Intact, Mucosa 

Moist & Pink, Nares Patent, Normal Nasal Septum, Pupils Equal, Pupils Reactive, 

Glasses, Other (Dentures, excessive ear wax is noted.)


Neck: Supple, Trachea Midline, 2


Lungs: Clear to Auscultation, Normal Respiratory Effort


Cardiovascular: Regular Rate, Regular Rhythm


GI/Abdominal Exam: Normal Bowel Sounds, Soft, Non-Tender, No Organomegaly, No 

Distention, No Abnormal Bruit, No Mass, Pelvis Stable


 (Male) Exam: Deferred


Rectal (Males) Exam: Deferred


Back Exam: Normal Inspection, Full Range of Motion


Extremities: Leg Pain (Left ankle edema, bruising, pain, and due to ankle 

fracture.), Limited Range of Motion


Peripheral Pulses: 2+: Brachial (L), Brachial (R), Dorsalis Pedis (L), Dorsalis 

Pedis (R)


Skin: Warm, Dry, Ecchymosis (Left ankle and foot including toes)


Neurological: Normal Speech, Normal Tone, Sensation Intact


Neuro Extensive - Mental Status: Alert, Oriented x3, Normal Mood/Affect, Normal 

Cognition, Memory Intact


Neuro Extensive - Motor, Sensory, Reflexes: CN II-XII Intact


Psychiatric: Alert, Normal Affect, Normal Mood





- Problem List


(1) Fracture of ankle


SNOMED Code(s): 22401956


   ICD Code: S82.899A - OTH FRACTURE OF UNSP LOWER LEG, INIT FOR CLOS FX   

Status: Acute   Priority: High   Current Visit: Yes   





(2) Fracture of fibula


SNOMED Code(s): 97079532


   ICD Code: S82.409A - UNSP FRACTURE OF SHAFT OF UNSP FIBULA, INIT FOR CLOS FX

   Status: Acute   Priority: High   Current Visit: Yes   





(3) Afib, Atrial fibrillation


SNOMED Code(s): 41845831


   ICD Code: I48.91 - UNSPECIFIED ATRIAL FIBRILLATION   Status: Acute   Priority

: Low   Current Visit: No   


Problem List Initiated/Reviewed/Updated: Yes


Orders Last 24hrs: 


 Active Orders 24 hr











 Category Date Time Status


 


 Patient Status [ADT] Routine ADT  08/23/18 00:45 Active


 


 Bedrest Bedside Commode [RC] ASDIRECTED Care  08/23/18 00:45 Active


 


 Intake and Output [RC] QSHIFT Care  08/23/18 00:45 Active


 


 Notify Provider Consults [RC] ASDIRECTED Care  08/23/18 00:45 Active


 


 Notify Provider Vital Signs [RC] ASDIRECTED Care  08/23/18 00:45 Active


 


 Oxygen Therapy [RC] PRN Care  08/23/18 00:45 Active


 


 Pulse Oximetry [RC] CONTINUOUS Care  08/23/18 00:45 Active


 


 RT Aerosol Therapy [RC] ASDIRECTED Care  08/23/18 00:45 Active


 


 VTE/DVT Education [RC] Per Unit Routine Care  08/23/18 00:45 Active


 


 Vital Signs [RC] Q4H Care  08/23/18 00:45 Active


 


 Consult to Physician [CONS] Routine Cons  08/23/18 00:45 Ordered


 


 OT Evaluation and Treatment [CONS] Routine Cons  08/23/18 00:45 Active


 


 Nothing per Oral After Midnight Diet [DIET] Diet  08/23/18 Breakfast Active


 


 Ankle Min 3V Lt [CR] Stat Exams  08/22/18 22:33 Taken


 


 BASIC METABOLIC PANEL,BMP [CHEM] AM Lab  08/23/18 05:11 Ordered


 


 CBC WITH AUTO DIFF [HEME] AM Lab  08/23/18 05:11 Ordered


 


 UA W/MICROSCOPIC [URIN] Routine Lab  08/23/18 00:45 Ordered


 


 Albuterol [Proventil Neb Soln] Med  08/23/18 00:45 Active





 2.5 mg NEB Q4H PRN   


 


 Docusate Sodium [Colace] Med  08/23/18 00:45 Active





 100 mg PO BID PRN   


 


 HYDROmorphone [Dilaudid] Med  08/23/18 00:45 Active





 1 mg IVPUSH Q3H PRN   


 


 LORazepam [Ativan] Med  08/23/18 00:45 Active





 1 mg IV Q6H PRN   


 


 Morphine Med  08/23/18 00:45 Active





 2 mg IVPUSH Q2H PRN   


 


 Ondansetron [Zofran] Med  08/23/18 00:45 Active





 4 mg IV Q4H PRN   


 


 Pantoprazole [ProTONIX IV***] Med  08/23/18 07:30 Active





 40 mg IVPUSH ACBREAKFAST   


 


 Sodium Chloride 0.9% [Normal Saline] 1,000 ml Med  08/22/18 23:45 Active





 IV ASDIRECTED   


 


 Sodium Chloride 0.9% [Normal Saline] 1,000 ml Med  08/23/18 00:45 Active





 IV ASDIRECTED   


 


 Zolpidem [Ambien] Med  08/23/18 00:45 Active





 5 mg PO BEDTIME   


 


 ceFAZolin [Ancef] 2 gm Med  08/23/18 11:30 Active





 Premix Bag 1 bag   





 IV DAILY   


 


 Resuscitation Status Routine Resus Stat  08/22/18 23:51 Ordered








 Medication Orders





Albuterol (Proventil Neb Soln)  2.5 mg NEB Q4H PRN


   PRN Reason: Shortness Of Breath/wheezing


Docusate Sodium (Colace)  100 mg PO BID PRN


   PRN Reason: Constipation


Hydromorphone HCl (Dilaudid)  1 mg IVPUSH Q3H PRN


   PRN Reason: Pain


Sodium Chloride (Normal Saline)  1,000 mls @ 999 mls/hr IV ASDIRECTED [facility]


   Last Admin: 08/22/18 23:46  Dose: 999 mls/hr


Cefazolin Sodium/Dextrose 2 gm (/ Premix)  50 mls @ 100 mls/hr IV DAILY PRN


   PRN Reason: Other


   Stop: 08/24/18 11:31


Sodium Chloride (Normal Saline)  1,000 mls @ 125 mls/hr IV ASDIRECTED [facility]


Lorazepam (Ativan)  1 mg IV Q6H PRN


   PRN Reason: Nausea/Vomiting


Morphine Sulfate (Morphine)  2 mg IVPUSH Q2H PRN


   PRN Reason: Pain (severe 7-10)


Ondansetron HCl (Zofran)  4 mg IV Q4H PRN


   PRN Reason: Nausea/Vomiting


Pantoprazole Sodium (Protonix Iv***)  40 mg IVPUSH ACBREAKFAST [facility]


Zolpidem Tartrate (Ambien)  5 mg PO BEDTIME [facility]








Assessment/Plan Comment:: 





ASSESSMENT / PLAN


-


left ankle pain; this is a 78 year old male presents to ER for left ankle pain. 

reports at 7pm this evening was out on his deck, sitting in a chair went to get 

up, the deck was wet and he slipped and twisted the ankle. He had immediate 

pain and swelling. unable to put any wt on the foot. denies any other injury, 

did not have any LOC.  He called his neighbor to bring him to the hospital.





X-rays of left ankle show a spiral oblique fracture of the distal fibula with 

abnormal mortise of ankle. Consult to Dr. Colin Horton who advised admission 

for surgery tomorrow at noon, also advised reduction of the ankle. This was 

done by ER provider without complication. Placed in the posterior's steel and 

foam splint. While in emergency room given IV fluids, Dilaudid, Ativan, and 

Zofran. 








Left ankle fracture


-Admit to 2 N. observation 


-Scheduled for surgery of the left ankle at noon on the 20/3/18


-Nothing by mouth after midnight


-Ancef 2 g. IV prior to surgery


-Pain control


-Melatonin or Ambien for sleep as he did


-Referral to OT for discharge planning


-Oxygen per nasal cannula to keep sats greater than 90%


-IV fluids for rehydration NS at 125 mL per hour


-Advise to notify nurses of any chest pain or other symptoms


-And a.m. labs: CBC, BMP





Atrial fibrillation, chronic


Treated with Xarelto, placed on hold pending surgery





Maintenance issues


-Orders home meds: On hold until surgery


-Nutrition: Nothing by mouth after midnight


-Rushing catheter not indicated at this time


-DVT: Xarelto on hold


-GI Prophalaxis; Protonix 40mg IV daily


-


CODE STATUS: Full





Admission status: Admit to Observation


-I expect this patient to stay less than 24 hours, not to exceed 96 hours for 

evaluation and management of this problem.





Disposition: Home





Primary care provider: Dr. Escobar





Hospitalist: Dr. Blum





Attending: Dr. Desiree Horton, orthopedic surgeon

## 2018-08-23 NOTE — CR
Ankle Min 3V Lt

 

CLINICAL HISTORY: Pain, fall

 

FINDINGS: The soft tissues are swollen. There is an oblique fracture of the distal fibula. There is l
ateral subluxation of the talus. There is some asymmetry at the talonavicular and calcaneocuboid duglas
culations.

 

Impression: Distal fibular fracture

 

Tibiotalar subluxation

 

Asymmetry at the talar and calcaneal articulations. Some of this may be positional

## 2018-08-24 VITALS — DIASTOLIC BLOOD PRESSURE: 52 MMHG | SYSTOLIC BLOOD PRESSURE: 106 MMHG

## 2018-08-24 NOTE — PCM.DCSUM1
**Discharge Summary





- Hospital Course


Brief History: Mr. Dias is a 78-year-old gentleman who was admitted through 

the emergency department after a fall resulting in left ankle pain secondary to 

an underlying left fibular fracture.





- Discharge Data


Discharge Date: 08/24/18


Discharge Disposition: Home, Self-Care 01


Condition: Fair





- Discharge Diagnosis/Problem(s)


(1) Atrial fibrillation with rapid ventricular response


SNOMED Code(s): 484667195475476


   ICD Code: I48.91 - UNSPECIFIED ATRIAL FIBRILLATION   Status: Acute   Current 

Visit: Yes   





(2) Fracture of fibula


SNOMED Code(s): 11575404


   ICD Code: S82.409A - UNSP FRACTURE OF SHAFT OF UNSP FIBULA, INIT FOR CLOS FX

   Status: Acute   Priority: High   Current Visit: Yes   





- Patient Summary/Data


Consults: 


 Consultations





08/23/18 00:45


Consult to Physician [CONS] Routine 


   Consulting Provider: Carrillo Horton Call Completed to Consulting Physician: Yes


   Reason for Consult: left ankle fracture; complicated


   Person Notified: Dr. Carrillo Horton


   Date Notified: 08/22/18


   Time Notified: 23:30


   Special Instructions: 2 gram Ancef prior to Surgery


                                                      schedule surgery at noon 

on 8/23/18


OT Evaluation and Treatment [CONS] Routine 


   Please Evaluate and Treat.


   OT Reason for Consult: Discharge Planning


   This query below is only for informational purposes and is not editable.





08/23/18 09:20


PT Evaluation and Treatment [CONS] Routine 


   Please Evaluate and Treat.


   PT Reason for Consult: Ambulation


   Pending Discharge: Yes


   Discharge Disposition: Home


   Special Instructions: assess for home equipment needs for ambulation


   This query below is only for informational purposes and is not editable.


   Admission Diagnosis/Problem: Ankle fracture





08/24/18 09:12


Consult to Physical Therapy [PT Evaluation and Treatment] [CONS] Routine 


   Please Evaluate and Treat.


   PT Reason for Consult: to prepare pt and assesss ability to d/c from hospital


   This query below is only for informational purposes and is not editable.


   Admission Diagnosis/Problem: Ankle fracture











Hospital Course: 





This is a 78 year old male who presented to ER for left ankle pain. Reports at 

7pm this evening was out on his deck, sitting in a chair went to get up, the 

deck was wet and he slipped and twisted the ankle. He had immediate pain and 

swelling. Unable to put any wt on the foot. denies any other injury, did not 

have any LOC.  He called his neighbor to bring him to the hospital. X-rays of 

left ankle show a spiral oblique fracture of the distal fibula was abnormal 

mortise of ankle. Consult to Dr. Colin Horton who advised admission for surgery

, also advised reduction of the ankle. This was done by ER provider without 

complication. Placed in the steel and foam splint. While in emergency room 

given IV fluids, Dilaudid, Ativan, and Zofran. 





Surgery was planned for the morning after admission, he does have a known 

history of chronic atrial fibrillation. Most recent echocardiogram showed 

mildly decreased left jugular function with estimated ejection fraction of 45-50

%. Prior to surgery he got up and was using the walker to ambulate without 

weightbearing. His heart rate increased significantly to the 170-190 range. 

During this period of time he became weak and diaphoretic with some 

lightheadedness, but denied any symptoms of chest pain or pressure or shortness 

of breath. Heart rate remained elevated in the 130 to 140 range so his surgery 

was canceled. He was transferred to the intensive care unit with the intention 

that we would start him on IV diltiazem for rate control. Heart rate did 

improve somewhat prior to that so he was started on oral diltiazem 30 mg every 

6 hours. This did result in good rate control at rest with heart rates in the 60

-80 range. Prior to discharge she was able to ambulate short distances with use 

of walker and no weightbearing on the left leg. He will be discharged home on 

oral diltiazem as well as pain medication with oxycodone and senna S as needed 

for constipation. He will return on Tuesday, August 28 for outpatient surgery 

with Dr. Colin Horton. He is instructed to remain with no weightbearing on the 

left leg and to be nothing by mouth after midnight on Monday, August 27. 

Activity will remain no weightbearing left leg, regular diet until midnight on 

Monday, August 27. He will return to the emergency department if he notes 

increased pain redness or swelling.





- Patient Instructions


Diet: Usual Diet as Tolerated


Activity: Non Weight Bearing (Left leg)


Notify Provider of: Increased Pain, Swelling and Redness


Other/Special Instructions: Plan for outpatient surgery with Dr. James Horton 

on Tuesday, August 28. Nothing by mouth after midnight Monday, August 27.





- Discharge Plan


*PRESCRIPTION DRUG MONITORING PROGRAM REVIEWED*: Not Applicable


*COPY OF PRESCRIPTION DRUG MONITORING REPORT IN PATIENT MINI: Not Applicable


Prescriptions/Med Rec: 


Diltiazem [Cardizem CD] 120 mg PO DAILY #30 cap.cd


oxyCODONE HCl/Acetaminophen [Oxycodone-Acetaminophen 5-325] 1 - 2 each PO Q4H 

PRN #40 tablet


 PRN Reason: Pain


Sennosides/Docusate Sodium [Senna-S] 1 each PO BID PRN #30 tablet


 PRN Reason: Constipation


Home Medications: 


 Home Meds





Lutein Extract/Zeaxanthin Ext [Lutein 15 MG Softgel] 1 each PO DAILY 01/06/14 [

History]


Multivitamin [Multi-Vitamin Daily] 1 each PO DAILY 01/06/14 [History]


atorvaSTATin [Lipitor] 20 mg PO BEDTIME 08/22/18 [History]


Diltiazem [Cardizem CD] 120 mg PO DAILY #30 cap.cd 08/24/18 [Rx]


Sennosides/Docusate Sodium [Senna-S] 1 each PO BID PRN #30 tablet 08/24/18 [Rx]


oxyCODONE HCl/Acetaminophen [Oxycodone-Acetaminophen 5-325] 1 - 2 each PO Q4H 

PRN #40 tablet 08/24/18 [Rx]








Referrals: 


Carrillo Horton DO [Physician] - 09/06/18 11:00 am





- Discharge Summary/Plan Comment


DC Time >30 min.: No





- Patient Data


Vitals - Most Recent: 


 Last Vital Signs











Temp  98.4 F   08/24/18 08:00


 


Pulse  92   08/24/18 08:39


 


Resp  16   08/24/18 08:00


 


BP  92/51 L  08/24/18 08:39


 


Pulse Ox  95   08/24/18 07:20











Weight - Most Recent: 209 lb 9.601 oz


I&O - Last 24 hours: 


 Intake & Output











 08/23/18 08/24/18 08/24/18





 22:59 06:59 14:59


 


Intake Total 1190 300 


 


Output Total 1525 425 


 


Balance -335 -125 











Lab Results - Last 24 hrs: 


 Laboratory Results - last 24 hr











  08/23/18 08/24/18 Range/Units





  11:20 05:45 


 


Sodium   136 L  (140-148)  mmol/L


 


Potassium   3.9  (3.6-5.2)  mmol/L


 


Chloride   101  (100-108)  mmol/L


 


Carbon Dioxide   27  (21-32)  mmol/L


 


Anion Gap   11.9  (5.0-14.0)  mmol/L


 


BUN   10  (7-18)  mg/dL


 


Creatinine   1.1  (0.8-1.3)  mg/dL


 


Est Cr Clr Drug Dosing   55.16  mL/min


 


Estimated GFR (MDRD)   > 60  (>60)  


 


Glucose   129 H  ()  mg/dL


 


Calcium   8.4 L  (8.5-10.1)  mg/dL


 


Magnesium  1.8   (1.8-2.4)  mg/dL


 


Troponin I  < 0.017   (0.000-0.056)  ng/mL











Med Orders - Current: 


 Current Medications





Acetaminophen (Tylenol)  650 mg PO Q4H PRN


   PRN Reason: Pain


Albuterol (Proventil Neb Soln)  2.5 mg NEB Q4H PRN


   PRN Reason: Shortness Of Breath/wheezing


Atorvastatin Calcium (Lipitor)  20 mg PO BEDTIME [facility]


   Last Admin: 08/23/18 20:58 Dose:  20 mg


Diltiazem HCl (Cardizem Cd)  120 mg PO DAILY [facility]


   Last Admin: 08/24/18 08:39 Dose:  120 mg


Docusate Sodium (Colace)  100 mg PO BID PRN


   PRN Reason: Constipation


Enoxaparin Sodium (Lovenox)  40 mg SUBCUT DAILY [facility]


   Last Admin: 08/24/18 09:29 Dose:  40 mg


Lorazepam (Ativan)  0.5 mg IV Q4H PRN


   PRN Reason: Nausea/Vomiting


Magnesium Oxide (Magnesium Oxide)  400 mg PO BID [facility]


   Last Admin: 08/24/18 08:28 Dose:  400 mg


Melatonin (Melatonin)  6 mg PO BEDTIME [facility]


   Last Admin: 08/23/18 20:59 Dose:  6 mg


Morphine Sulfate (Morphine)  2 mg IVPUSH Q2H PRN


   PRN Reason: Pain (severe 7-10)


   Last Admin: 08/23/18 21:57 Dose:  2 mg


Ondansetron HCl (Zofran)  4 mg IV Q4H PRN


   PRN Reason: Nausea/Vomiting


Oxycodone HCl (Oxycodone)  5 - 10 mg PO Q4H PRN


   PRN Reason: Pain


   Last Admin: 08/24/18 05:27 Dose:  10 mg


Tamsulosin HCl (Flomax)  0.4 mg PO BEDTIME [facility]


   Last Admin: 08/23/18 20:58 Dose:  0.4 mg


Zolpidem Tartrate (Ambien)  5 mg PO BEDTIME [facility]


   Last Admin: 08/23/18 20:58 Dose:  5 mg





Discontinued Medications





Bupivacaine HCl (Marcaine 0.5%) Confirm Administered Dose 50 ml .ROUTE .STK-MED 

ONE


   Stop: 08/23/18 10:32


Diltiazem HCl (Diltiazem)  20 mg IVPUSH ONETIME ONE


   Stop: 08/23/18 11:46


   Last Admin: 08/23/18 18:10 Dose:  Not Given


Diltiazem HCl (Cardizem)  30 mg PO Q6H [facility]


   Last Admin: 08/24/18 05:27 Dose:  30 mg


Gentamicin Sulfate (Gentamicin) Confirm Administered Dose 240 mg .ROUTE .[facility]-

MED ONE


   Stop: 08/23/18 10:32


Hydromorphone HCl (Dilaudid)  1 mg IVPUSH ONETIME ONE


   Stop: 08/22/18 23:31


   Last Admin: 08/22/18 23:52 Dose:  1 mg


Hydromorphone HCl (Dilaudid)  1 mg IVPUSH Q3H PRN


   PRN Reason: Pain


   Last Admin: 08/23/18 07:35 Dose:  1 mg


Sodium Chloride (Normal Saline)  1,000 mls @ 999 mls/hr IV ASDIRECTED [facility]


   Last Admin: 08/22/18 23:46 Dose:  999 mls/hr


Cefazolin Sodium/Dextrose 2 gm (/ Premix)  50 mls @ 100 mls/hr IV DAILY PRN


   PRN Reason: Other


   Stop: 08/24/18 11:31


Sodium Chloride (Normal Saline)  1,000 mls @ 125 mls/hr IV ASDIRECTED [facility]


   Last Admin: 08/23/18 10:05 Dose:  125 mls/hr


Cefazolin Sodium 2 gm/ Sodium (Chloride)  50 mls @ 100 mls/hr IV ONCALL ONE


   Stop: 08/23/18 11:59


   Last Admin: 08/23/18 13:56 Dose:  Not Given


Diltiazem HCl 125 mg/ Dextrose (/Water)  125 mls @ 5 mls/hr IV TITRATE [facility]; 

Protocol


Lorazepam (Ativan)  0.5 mg IVPUSH ONETIME ONE


   Stop: 08/22/18 23:31


   Last Admin: 08/22/18 23:52 Dose:  0.5 mg


Lorazepam (Ativan)  1 mg IV Q6H PRN


   PRN Reason: Nausea/Vomiting


Melatonin (Melatonin)  6 mg PO BEDTIME [facility]


Ondansetron HCl (Zofran)  4 mg IVPUSH ONETIME ONE


   Stop: 08/22/18 23:31


   Last Admin: 08/22/18 23:53 Dose:  4 mg


Pantoprazole Sodium (Protonix Iv***)  40 mg IVPUSH ACBREAKFAST [facility]


   Last Admin: 08/23/18 07:42 Dose:  40 mg


Povidone Iodine (Betadine 10% Soln) Confirm Administered Dose 1 ml .ROUTE .[facility]-

MED ONE


   Stop: 08/23/18 10:32











- Exam


General: Reports: Alert, Oriented, Cooperative, Mild Distress


Lungs: Reports: Clear to Auscultation, Normal Respiratory Effort


Cardiovascular: Reports: Regular Rate, No Murmurs, Irregular Rhythm


GI/Abdominal Exam: Soft, Non-Tender, No Organomegaly, No Distention


Extremities: Other

## 2018-08-24 NOTE — PCM.PN
- General Info


Date of Service: 08/24/18


Subjective Update: 





Mr. Dias has been stable since yesterday, heart rate is come under better 

control with use of oral diltiazem. He denies any symptoms of chest pain or 

pressure or significant shortness of breath. Continues to experience pain in 

his left lower leg secondary to his ankle fracture.





- Review of Systems


General: Denies: Fever, Weakness, Chills


Pulmonary: Reports: No Symptoms


Cardiovascular: Reports: No Symptoms


Gastrointestinal: Reports: No Symptoms


Musculoskeletal: Reports: Other (Pain left lower leg)





- Patient Data


Vitals - Most Recent: 


 Last Vital Signs











Temp  98.4 F   08/24/18 08:00


 


Pulse  92   08/24/18 08:39


 


Resp  16   08/24/18 08:00


 


BP  92/51 L  08/24/18 08:39


 


Pulse Ox  95   08/24/18 07:20











Weight - Most Recent: 209 lb 9.601 oz


I&O - Last 24 Hours: 


 Intake & Output











 08/23/18 08/24/18 08/24/18





 22:59 06:59 14:59


 


Intake Total 1190 300 


 


Output Total 1525 425 


 


Balance -335 -125 











Lab Results Last 24 Hours: 


 Laboratory Results - last 24 hr











  08/23/18 08/24/18 Range/Units





  11:20 05:45 


 


Sodium   136 L  (140-148)  mmol/L


 


Potassium   3.9  (3.6-5.2)  mmol/L


 


Chloride   101  (100-108)  mmol/L


 


Carbon Dioxide   27  (21-32)  mmol/L


 


Anion Gap   11.9  (5.0-14.0)  mmol/L


 


BUN   10  (7-18)  mg/dL


 


Creatinine   1.1  (0.8-1.3)  mg/dL


 


Est Cr Clr Drug Dosing   55.16  mL/min


 


Estimated GFR (MDRD)   > 60  (>60)  


 


Glucose   129 H  ()  mg/dL


 


Calcium   8.4 L  (8.5-10.1)  mg/dL


 


Magnesium  1.8   (1.8-2.4)  mg/dL


 


Troponin I  < 0.017   (0.000-0.056)  ng/mL











Med Orders - Current: 


 Current Medications





Acetaminophen (Tylenol)  650 mg PO Q4H PRN


   PRN Reason: Pain


Albuterol (Proventil Neb Soln)  2.5 mg NEB Q4H PRN


   PRN Reason: Shortness Of Breath/wheezing


Atorvastatin Calcium (Lipitor)  20 mg PO BEDTIME [facility]


   Last Admin: 08/23/18 20:58 Dose:  20 mg


Diltiazem HCl (Cardizem Cd)  120 mg PO DAILY [facility]


   Last Admin: 08/24/18 08:39 Dose:  120 mg


Docusate Sodium (Colace)  100 mg PO BID PRN


   PRN Reason: Constipation


Lorazepam (Ativan)  0.5 mg IV Q4H PRN


   PRN Reason: Nausea/Vomiting


Magnesium Oxide (Magnesium Oxide)  400 mg PO BID [facility]


   Last Admin: 08/24/18 08:28 Dose:  400 mg


Melatonin (Melatonin)  6 mg PO BEDTIME [facility]


   Last Admin: 08/23/18 20:59 Dose:  6 mg


Morphine Sulfate (Morphine)  2 mg IVPUSH Q2H PRN


   PRN Reason: Pain (severe 7-10)


   Last Admin: 08/23/18 21:57 Dose:  2 mg


Ondansetron HCl (Zofran)  4 mg IV Q4H PRN


   PRN Reason: Nausea/Vomiting


Oxycodone HCl (Oxycodone)  5 - 10 mg PO Q4H PRN


   PRN Reason: Pain


   Last Admin: 08/24/18 05:27 Dose:  10 mg


Tamsulosin HCl (Flomax)  0.4 mg PO BEDTIME [facility]


   Last Admin: 08/23/18 20:58 Dose:  0.4 mg


Zolpidem Tartrate (Ambien)  5 mg PO BEDTIME [facility]


   Last Admin: 08/23/18 20:58 Dose:  5 mg





Discontinued Medications





Bupivacaine HCl (Marcaine 0.5%) Confirm Administered Dose 50 ml .ROUTE .STK-MED 

ONE


   Stop: 08/23/18 10:32


Diltiazem HCl (Diltiazem)  20 mg IVPUSH ONETIME ONE


   Stop: 08/23/18 11:46


   Last Admin: 08/23/18 18:10 Dose:  Not Given


Diltiazem HCl (Cardizem)  30 mg PO Q6H [facility]


   Last Admin: 08/24/18 05:27 Dose:  30 mg


Gentamicin Sulfate (Gentamicin) Confirm Administered Dose 240 mg .ROUTE .STK-

MED ONE


   Stop: 08/23/18 10:32


Hydromorphone HCl (Dilaudid)  1 mg IVPUSH ONETIME ONE


   Stop: 08/22/18 23:31


   Last Admin: 08/22/18 23:52 Dose:  1 mg


Hydromorphone HCl (Dilaudid)  1 mg IVPUSH Q3H PRN


   PRN Reason: Pain


   Last Admin: 08/23/18 07:35 Dose:  1 mg


Sodium Chloride (Normal Saline)  1,000 mls @ 999 mls/hr IV ASDIRECTED [facility]


   Last Admin: 08/22/18 23:46 Dose:  999 mls/hr


Cefazolin Sodium/Dextrose 2 gm (/ Premix)  50 mls @ 100 mls/hr IV DAILY PRN


   PRN Reason: Other


   Stop: 08/24/18 11:31


Sodium Chloride (Normal Saline)  1,000 mls @ 125 mls/hr IV ASDIRECTED [facility]


   Last Admin: 08/23/18 10:05 Dose:  125 mls/hr


Cefazolin Sodium 2 gm/ Sodium (Chloride)  50 mls @ 100 mls/hr IV ONCALL ONE


   Stop: 08/23/18 11:59


   Last Admin: 08/23/18 13:56 Dose:  Not Given


Diltiazem HCl 125 mg/ Dextrose (/Water)  125 mls @ 5 mls/hr IV TITRATE [facility]; 

Protocol


Lorazepam (Ativan)  0.5 mg IVPUSH ONETIME ONE


   Stop: 08/22/18 23:31


   Last Admin: 08/22/18 23:52 Dose:  0.5 mg


Lorazepam (Ativan)  1 mg IV Q6H PRN


   PRN Reason: Nausea/Vomiting


Melatonin (Melatonin)  6 mg PO BEDTIME [facility]


Ondansetron HCl (Zofran)  4 mg IVPUSH ONETIME ONE


   Stop: 08/22/18 23:31


   Last Admin: 08/22/18 23:53 Dose:  4 mg


Pantoprazole Sodium (Protonix Iv***)  40 mg IVPUSH ACBREAKFAST [facility]


   Last Admin: 08/23/18 07:42 Dose:  40 mg


Povidone Iodine (Betadine 10% Soln) Confirm Administered Dose 1 ml .ROUTE .STK-

MED ONE


   Stop: 08/23/18 10:32











- Exam


Quality Assessment: DVT Prophylaxis.  No: Supplemental Oxygen


General: Alert, Oriented, Cooperative, No Acute Distress


Lungs: Clear to Auscultation, Normal Respiratory Effort


Cardiovascular: Regular Rate, Irregular Rhythm.  No: Bradycardia, Murmurs


GI/Abdominal Exam: Soft, Non-Tender, No Organomegaly, No Distention


Extremities: Other (Splint in place left lower leg)





- Problem List Review


Problem List Initiated/Reviewed/Updated: Yes





- My Orders


Last 24 Hours: 


My Active Orders





08/23/18 09:20


PT Evaluation and Treatment [CONS] Routine 





08/23/18 10:56


Cardiac Monitoring [RC] .As Directed 


EKG 12 Lead [EK] Routine 





08/23/18 10:57


EKG Documentation Completion [RC] ASDIRECTED 





08/23/18 11:21


Patient Status [ADT] Routine 





08/23/18 13:41


Convert IV to Saline Lock [OM.PC] Routine 





08/23/18 13:45


Magnesium Oxide   400 mg PO BID 





08/23/18 13:48


oxyCODONE   5 - 10 mg PO Q4H PRN 





08/23/18 13:51


LORazepam [Ativan]   0.5 mg IV Q4H PRN 





08/23/18 14:04


Acetaminophen [Tylenol]   650 mg PO Q4H PRN 





08/23/18 21:00


Tamsulosin [Flomax]   0.4 mg PO BEDTIME 


atorvaSTATin [Lipitor]   20 mg PO BEDTIME 





08/23/18 Lunch


Regular Diet [DIET] 





08/24/18 09:00


Diltiazem [Cardizem CD]   120 mg PO DAILY 


Enoxaparin [Lovenox]   40 mg SUBCUT DAILY 














- Plan


Plan:: 





ASSESSMENT / PLAN





Left ankle fracture-surgery is on hold because of atrial fibrillation with 

rapid ventricular response


-Plan for outpatient surgery next Tuesday





Atrial fibrillation, chronic-rate control has improved with use of oral 

diltiazem


-Transfer to intensive care unit for further evaluation and management


-Diltiazem  mg by mouth daily


Treated with Xarelto, placed on hold pending surgery





Maintenance issues


-Orders home meds: Resume usual oral medications


-Nutrition: Regular diet


-Rushing catheter not indicated at this time


-DVT: Xarelto on hold


-GI Prophalaxis; Protonix 40mg IV daily


-


CODE STATUS: Full





Admission status: Admit to Observation


-I expect this patient to stay less than 24 hours, not to exceed 96 hours for 

evaluation and management of this problem.





Disposition: Home





Primary care provider: Dr. Escobar





Hospitalist: Dr. Blum





Attending: Dr. Desiree Horton, orthopedic surgeon

## 2018-08-28 ENCOUNTER — HOSPITAL ENCOUNTER (OUTPATIENT)
Dept: HOSPITAL 11 - JP.SDS | Age: 78
LOS: 1 days | Discharge: HOME | End: 2018-08-29
Attending: ORTHOPAEDIC SURGERY
Payer: MEDICARE
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
## 2018-08-28 DIAGNOSIS — Z88.2: ICD-10-CM

## 2018-08-28 DIAGNOSIS — I48.91: ICD-10-CM

## 2018-08-28 DIAGNOSIS — S82.832A: Primary | ICD-10-CM

## 2018-08-28 PROCEDURE — 27792 TREATMENT OF ANKLE FRACTURE: CPT

## 2018-08-28 PROCEDURE — 76000 FLUOROSCOPY <1 HR PHYS/QHP: CPT

## 2018-08-28 PROCEDURE — 97161 PT EVAL LOW COMPLEX 20 MIN: CPT

## 2018-08-28 PROCEDURE — C1713 ANCHOR/SCREW BN/BN,TIS/BN: HCPCS

## 2018-08-28 RX ADMIN — HYDROCODONE BITARTRATE AND ACETAMINOPHEN PRN TAB: 5; 325 TABLET ORAL at 19:32

## 2018-08-28 RX ADMIN — HYDROCODONE BITARTRATE AND ACETAMINOPHEN PRN TAB: 5; 325 TABLET ORAL at 23:38

## 2018-08-28 NOTE — OR
DATE OF PROCEDURE:  08/28/2018

 

PREOPERATIVE DIAGNOSIS:  Left distal fibula fracture, closed.

 

POSTOPERATIVE DIAGNOSIS:  Left distal fibula fracture, closed.

 

PROCEDURE:  Open reduction and internal fixation of left distal fibula.

 

ANESTHESIA:  Laryngeal mask airway.

 

FLUIDS:  Lactated Ringer solution.

 

ESTIMATED BLOOD LOSS:  25 mL.

 

COMPLICATION:  None.

 

SPECIMEN:  None.

 

DISCHARGE DISPOSITION:  Stable to PACU.

 

INDICATION FOR PROCEDURE:  The patient was seen preoperatively in the hospital.  He was

admitted for distal fibular fracture.  He was out on his deck with a girlfriend and tripped.

We were planning on doing surgery last week; however, he went into atrial fibrillation on

the floor.  He was treated for atrial fibrillation.  He was still in atrial fibrillation,

but stable with a controlled heart rate.  He went home over the weekend.  He came back in

this morning.  Preoperative imaging confirmed the above-mentioned diagnosis.  He was

nonweightbearing after being placed into a splint by the emergency department physician.

 

DETAILS OF PROCEDURE:  The patient was seen preoperatively by myself and the Anesthesia

staff in the preoperative holding area, where the operative site was marked.  He was brought

to the operative suite by Anesthesia staff, where general anesthesia was administered.  A

well-padded tourniquet was placed on the left thigh.  The left lower extremity was then

prepped and draped in a sterile manner.  Time-out was called identifying the correct

patient, the correct procedure, the correct site, and that antibiotics had been begun within

the appropriate of time.  The left lower extremity was then exsanguinated and tourniquet was

raised to 300 mmHg.  We then made an incision over the distal fibula after confirming

fracture site location on fluoroscopy.  I then encountered a hematoma immediately with

increased swelling.  I used Metzenbaums to go down to the fracture site and then carefully

elevate the soft tissue off the distal fibula.  The superficial peroneal nerve was not

encountered.  This was a highly-comminuted fracture.  I spent a few minutes cleaning off the

edges, irrigating and then putting the pieces back in where they should be.  I used bone

tenaculums to get the distal fragment out to length, which helped the reduction.  While not

perfect, this did restore the distal fibular length adequately, and the mortise appeared to

be normal on AP fluoroscopy.  At that point in time, we used a short distal fibular plate,

which was anatomic.  I used a K-wire to put it in position and then while holding it out to

length, I drilled 3.5 cortical screw and placed it in the middle cortical holes on the

shaft.  This held it out to length.  I then placed all of my distal fibular locking screws

and then the remainder of my two 3.5 cortical screws.  I stressed the ankle to make sure

that the syndesmosis was intact, which it was.  We then took AP and lateral final films,

copiously irrigated with saline, which was Betadine infused.  Then, I did place a small

amount of vancomycin.  I then closed with #1 STRATAFIX, staples, and a sterile dressing

followed by Ace wrap and then placed him into a Cam Walker boot, in which he is supposed to

be nonweightbearing.

 

Please note that, prior to prepping, he did have significant odor to his foot, so I

personally used chlorhexidine scrub with a brush and scrubbed this twice, that is also why I

applied the vancomycin subcutaneously.  He also had a blister posteriorly, which we did

place an op-site over.

 

 

 

 

Carrillo Horton DO

DD:  08/28/2018 12:21:05

DT:  08/28/2018 16:23:47

Job #:  [id number]

## 2018-08-29 VITALS — SYSTOLIC BLOOD PRESSURE: 137 MMHG | DIASTOLIC BLOOD PRESSURE: 83 MMHG

## 2018-08-29 RX ADMIN — HYDROCODONE BITARTRATE AND ACETAMINOPHEN PRN TAB: 5; 325 TABLET ORAL at 07:44

## 2018-08-29 RX ADMIN — HYDROCODONE BITARTRATE AND ACETAMINOPHEN PRN TAB: 5; 325 TABLET ORAL at 03:53

## 2018-08-29 NOTE — [id number]
**Discharge Summary





- Hospital Course


Brief History: 77 yo male left distal fibula fracture last week went into afib 

just prior to scheduled surgery.  Afib controlled by Dr. Elizalde for surgery 8/ 28.


Diagnosis: Stroke: No





- Discharge Data


Discharge Date: 08/29/18


Discharge Disposition: Home, Self-Care 01


Condition: Good





- Discharge Diagnosis/Problem(s)


(1) Status post ORIF of fracture of ankle


SNOMED Code(s): 588840309


   ICD Code: Z96.7 - PRESENCE OF OTHER BONE AND TENDON IMPLANTS; Z87.81 - 

PERSONAL HISTORY OF (HEALED) TRAUMATIC FRACTURE   Status: Acute   Current Visit

: Yes   Problem Details: Left   





(2) Fracture of fibula


SNOMED Code(s): 02355900


   ICD Code: S82.409A - UNSP FRACTURE OF SHAFT OF UNSP FIBULA, INIT FOR CLOS FX

   Status: Acute   Priority: High   Current Visit: No   





- Patient Summary/Data


Operative Procedure(s) Performed: l ankle orif


Complications: none


Consults: 


 Consultations





08/28/18 12:24


OT Evaluation and Treatment [CONS] Routine 


   Please Evaluate and Treat.


   OT Reason for Consult: Strengthening


   This query below is only for informational purposes and is not editable.


PT Evaluation and Treatment [CONS] Routine 


   Please Evaluate and Treat.


   PT Reason for Consult: Strengthening


   This query below is only for informational purposes and is not editable.


Respiratory Care Assess and Treatment [CONS] Routine 


   Comment: 


   Physician Instructions: Post-Op Pneumonia Prevention














- Patient Instructions


Diet: Usual Diet as Tolerated


Activity: Apply Ice, Cough & Deep Breathe, Non Weight Bearing, No Strenuous 

Activities


Driving: Do Not Drive


Showering/Bathing: May Shower


Showering/Bathing, Other: keep dressing clean and dry if showering


Wound/Incision Care: Keep Operative Site/Wound Site Clean and Dry


Notify Provider of: Fever, Increased Pain, Swelling and Redness, Drainage, 

Nausea and/or Vomiting





- Discharge Plan


Home Medications: 


 Home Meds





Lutein Extract/Zeaxanthin Ext [Lutein 15 MG Softgel] 1 each PO DAILY 01/06/14 [

History]


Multivitamin [Multi-Vitamin Daily] 1 each PO DAILY 01/06/14 [History]


atorvaSTATin [Lipitor] 20 mg PO BEDTIME 08/22/18 [History]


Diltiazem [Cardizem CD] 120 mg PO DAILY #30 cap.cd 08/24/18 [Rx]


Sennosides/Docusate Sodium [Senna-S] 1 each PO BID PRN #30 tablet 08/24/18 [Rx]


oxyCODONE HCl/Acetaminophen [Oxycodone-Acetaminophen 5-325] 1 - 2 each PO Q4H 

PRN #40 tablet 08/24/18 [Rx]


Rivaroxaban [Xarelto] 20 mg PO DAILY 08/27/18 [History]








Referrals: 


Carrillo Horton DO [Physician] - 





- General Info


Date of Service: 08/29/18


Functional Status: Reports: Pain Controlled, Tolerating Diet, Urinating





- Review of Systems


General: Reports: No Symptoms


HEENT: Reports: No Symptoms


Pulmonary: Reports: No Symptoms


Cardiovascular: Reports: No Symptoms


Gastrointestinal: Reports: No Symptoms


Genitourinary: Reports: No Symptoms


Musculoskeletal: Reports: Leg Pain, Joint Pain


Skin: Reports: No Symptoms


Neurological: Reports: No Symptoms


Psychiatric: Reports: No Symptoms





- Patient Data


Vitals - Most Recent: 


 Last Vital Signs











Temp  97.5 F   08/29/18 07:22


 


Pulse  74   08/29/18 09:03


 


Resp  18   08/29/18 07:22


 


BP  137/83   08/29/18 09:03


 


Pulse Ox  98   08/29/18 07:22











Weight - Most Recent: 200 lb


I&O - Last 24 hours: 


 Intake & Output











 08/28/18 08/29/18 08/29/18





 22:59 06:59 14:59


 


Intake Total 600 650 300


 


Output Total 225  450


 


Balance 375 650 -150











Med Orders - Current: 


 Current Medications





Hydrocodone Bitart/Acetaminophen (Norco 325-5 Mg)  1 tab PO Q4H PRN


   PRN Reason: Pain


   Last Admin: 08/29/18 07:44 Dose:  1 tab


Atorvastatin Calcium (Lipitor)  20 mg PO BEDTIME STEVEN


   Last Admin: 08/28/18 20:59 Dose:  20 mg


Diltiazem HCl (Cardizem Cd)  120 mg PO DAILY STEVEN


   Last Admin: 08/29/18 09:03 Dose:  120 mg


Sodium Chloride (Normal Saline)  1,000 mls @ 0 mls/hr IV ASDIRECTED STEVEN


   Last Admin: 08/28/18 10:19 Dose:  100 mls/hr


Cefazolin Sodium 2 gm/ Sodium (Chloride)  50 mls @ 100 mls/hr IV Q8H STEVEN


   Stop: 08/29/18 10:29


   Last Admin: 08/29/18 09:02 Dose:  100 mls/hr


Morphine Sulfate (Morphine)  2 mg IVPUSH Q2H PRN


   PRN Reason: Pain


Naloxone HCl (Narcan)  0.1 mg IVPUSH ONETIME PRN


   PRN Reason: in


Rivaroxaban (Xarelto)  20 mg PO DAILY [facility]


   Last Admin: 08/29/18 09:04 Dose:  20 mg


Senna/Docusate Sodium (Senna Plus)  1 tab PO BID PRN


   PRN Reason: Constipation


Tramadol HCl (Ultram)  50 mg PO Q4H PRN


   PRN Reason: Pain


Zolpidem Tartrate (Ambien)  5 mg PO BEDTIME PRN


   PRN Reason: Insomnia





Discontinued Medications





Bupivacaine HCl (Marcaine 0.5%) Confirm Administered Dose 50 ml .ROUTE .STK-MED 

ONE


   Stop: 08/28/18 09:57


   Last Admin: 08/28/18 12:09 Dose:  38 ml


Dexamethasone (Dexamethasone) Confirm Administered Dose 4 mg .ROUTE .STK-MED ONE


   Stop: 08/28/18 10:49


Fentanyl (Sublimaze) Confirm Administered Dose 250 mcg .ROUTE .STK-MED ONE


   Stop: 08/28/18 10:49


Gentamicin Sulfate (Gentamicin) Confirm Administered Dose 80 mg .ROUTE .STK-MED 

ONE


   Stop: 08/28/18 09:57


Glycopyrrolate (Robinul) Confirm Administered Dose 1 mg .ROUTE .STK-MED ONE


   Stop: 08/28/18 10:54


Cefazolin Sodium 2 gm/ Sodium (Chloride)  50 mls @ 100 mls/hr IV ONETIME ONE


   Stop: 08/28/18 10:59


   Last Admin: 08/28/18 11:06 Dose:  100 mls/hr


Neostigmine Methylsulfate (Neostigmine) Confirm Administered Dose 5 mg .ROUTE 

.STK-MED ONE


   Stop: 08/28/18 10:54


Ondansetron HCl (Zofran) Confirm Administered Dose 4 mg .ROUTE .STK-MED ONE


   Stop: 08/28/18 10:49


Povidone Iodine (Betadine 10% Soln) Confirm Administered Dose 1 ml .ROUTE .STK-

MED ONE


   Stop: 08/28/18 09:57


   Last Admin: 08/28/18 11:35 Dose:  30 ml


Propofol (Diprivan  20 Ml) Confirm Administered Dose 200 mg .ROUTE .STK-MED ONE


   Stop: 08/28/18 10:49


Rocuronium Bromide (Zemuron) Confirm Administered Dose 50 mg .ROUTE .STK-MED ONE


   Stop: 08/28/18 10:49


Succinylcholine Chloride (Quelicin) Confirm Administered Dose 200 mg .ROUTE .STK

-MED ONE


   Stop: 08/28/18 10:49


Vancomycin HCl (Vancomycin) Confirm Administered Dose 1 gm .ROUTE .STK-MED ONE


   Stop: 08/28/18 12:03


   Last Admin: 08/28/18 12:06 Dose:  1 gm











- Exam


General: Reports: Alert, Oriented


HEENT: Reports: Pupils Equal, Pupils Reactive, Mucous Membr. Moist/Pink


Neck: Reports: Supple


Lungs: Reports: Clear to Auscultation


GI/Abdominal Exam: No Distention


Extremities: Leg Pain, Limited Range of Motion


Skin: Reports: Warm, Dry, Intact


Wound/Incisions: Reports: Healing Well


Neurological: Reports: No New Focal Deficit


Psy/Mental Status: Reports: Alert, Normal Affect, Normal Mood





Discharge Operative/Procedures





- Procedures Performed


Operations: orif l distal fibula

## 2018-08-29 NOTE — CR
Fluoro Up To 1Hr

 

CLINICAL HISTORY: Open reduction ankle fracture

 

FINDINGS: Intraoperative fluoroscopy was used during surgical fixation of the distal fibula. Fluorosc
opy time was 40.3 seconds